# Patient Record
Sex: MALE | Race: WHITE | NOT HISPANIC OR LATINO | Employment: UNEMPLOYED | URBAN - NONMETROPOLITAN AREA
[De-identification: names, ages, dates, MRNs, and addresses within clinical notes are randomized per-mention and may not be internally consistent; named-entity substitution may affect disease eponyms.]

---

## 2021-05-15 ENCOUNTER — HOSPITAL ENCOUNTER (EMERGENCY)
Facility: HOSPITAL | Age: 26
Discharge: HOME | End: 2021-05-15
Attending: EMERGENCY MEDICINE | Admitting: EMERGENCY MEDICINE

## 2021-05-15 VITALS
BODY MASS INDEX: 29.62 KG/M2 | HEIGHT: 69 IN | RESPIRATION RATE: 22 BRPM | TEMPERATURE: 97.6 F | SYSTOLIC BLOOD PRESSURE: 113 MMHG | WEIGHT: 200 LBS | DIASTOLIC BLOOD PRESSURE: 55 MMHG | OXYGEN SATURATION: 88 % | HEART RATE: 76 BPM

## 2021-05-15 DIAGNOSIS — S05.02XA ABRASION OF LEFT CORNEA, INITIAL ENCOUNTER: Primary | ICD-10-CM

## 2021-05-15 PROCEDURE — 99282 EMERGENCY DEPT VISIT SF MDM: CPT

## 2021-05-15 PROCEDURE — 99284 EMERGENCY DEPT VISIT MOD MDM: CPT | Performed by: EMERGENCY MEDICINE

## 2021-05-15 RX ORDER — SPIRONOLACTONE 25 MG/1
25 TABLET ORAL DAILY
COMMUNITY

## 2021-05-15 RX ORDER — FUROSEMIDE 20 MG/1
20 TABLET ORAL
COMMUNITY

## 2021-05-15 RX ORDER — DIGOXIN 125 MCG
125 TABLET ORAL DAILY
COMMUNITY

## 2021-05-15 RX ORDER — LISINOPRIL 40 MG/1
40 TABLET ORAL DAILY
COMMUNITY

## 2021-05-15 RX ORDER — OFLOXACIN 3 MG/ML
1 SOLUTION/ DROPS OPHTHALMIC
Status: DISCONTINUED | OUTPATIENT
Start: 2021-05-15 | End: 2021-05-15 | Stop reason: HOSPADM

## 2021-05-15 RX ADMIN — FLUORESCEIN SODIUM 1 STRIP: 1 STRIP OPHTHALMIC at 20:41

## 2021-05-15 RX ADMIN — OFLOXACIN 1 DROP: 3 SOLUTION/ DROPS OPHTHALMIC at 21:02

## 2021-05-16 NOTE — ED PROVIDER NOTES
History  Chief Complaint   Patient presents with    Foreign Body in Eye     pt chopping wood, states a piece of bark flew into left eye  eye is red and irritated, unsure if anything is still in it     Patient is a 78-year-old male presenting to the emergency department complaining of foreign body sensation to the left eye, states he was chopping wood earlier and felt a small piece of wood fly into his left eye, he is unsure whether foreign body is still in there or if he has possible corneal abrasion, denies any pain or injury elsewhere, no vision changes, no drainage from the eye          Prior to Admission Medications   Prescriptions Last Dose Informant Patient Reported? Taking? ASPIRIN 81 PO   Yes No   Sig: Take 81 mg by mouth daily   digoxin (LANOXIN) 0 125 mg tablet   Yes No   Sig: Take 125 mcg by mouth daily   furosemide (LASIX) 20 mg tablet   Yes No   Sig: Take 20 mg by mouth   lisinopril (ZESTRIL) 40 mg tablet   Yes No   Sig: Take 40 mg by mouth daily   spironolactone (ALDACTONE) 25 mg tablet   Yes No   Sig: Take 25 mg by mouth daily      Facility-Administered Medications: None       History reviewed  No pertinent past medical history  Past Surgical History:   Procedure Laterality Date    CARDIAC SURGERY      HERNIA REPAIR         History reviewed  No pertinent family history  I have reviewed and agree with the history as documented  E-Cigarette/Vaping    E-Cigarette Use Never User      E-Cigarette/Vaping Substances     Social History     Tobacco Use    Smoking status: Never Smoker    Smokeless tobacco: Never Used   Substance Use Topics    Alcohol use: Not Currently    Drug use: Never       Review of Systems   Constitutional: Negative  HENT: Negative  Eyes: Positive for pain and redness  Respiratory: Negative  Cardiovascular: Negative  Gastrointestinal: Negative  Endocrine: Negative  Genitourinary: Negative  Musculoskeletal: Negative  Skin: Negative  Allergic/Immunologic: Negative  Neurological: Negative  Hematological: Negative  Psychiatric/Behavioral: Negative  Physical Exam  Physical Exam  Constitutional:       Appearance: He is well-developed  HENT:      Head: Normocephalic and atraumatic  Eyes:      General: Lids are normal  Lids are everted, no foreign bodies appreciated  Vision grossly intact  Gaze aligned appropriately  Left eye: No foreign body, discharge or hordeolum  Extraocular Movements: Extraocular movements intact  Conjunctiva/sclera: Conjunctivae normal       Pupils: Pupils are equal, round, and reactive to light  Comments: Small corneal abrasion noted to the left eye at the 11 o'clock position on the iris, no foreign body noted   Neck:      Musculoskeletal: Normal range of motion and neck supple  Cardiovascular:      Rate and Rhythm: Normal rate  Pulmonary:      Effort: Pulmonary effort is normal    Abdominal:      Palpations: Abdomen is soft  Musculoskeletal: Normal range of motion  Skin:     General: Skin is warm and dry  Neurological:      Mental Status: He is alert and oriented to person, place, and time           Vital Signs  ED Triage Vitals [05/15/21 2035]   Temperature Pulse Respirations Blood Pressure SpO2   97 6 °F (36 4 °C) 76 22 113/55 (!) 88 %      Temp Source Heart Rate Source Patient Position - Orthostatic VS BP Location FiO2 (%)   Temporal Monitor Sitting Right arm --      Pain Score       --           Vitals:    05/15/21 2035   BP: 113/55   Pulse: 76   Patient Position - Orthostatic VS: Sitting       ED Medications  Medications   fluorescein sodium sterile ophthalmic strip 1 strip (1 strip Left Eye Given 5/15/21 2041)       Diagnostic Studies  Results Reviewed     None                 No orders to display                   ED Course  ED Course as of May 16 0351   Sun May 16, 2021   0351 Patient was small corneal abrasion to the left eye, no foreign body appreciated, provided with antibiotic drops and instructions for use, advised to return if symptoms worsen or any additional concerns, patient acknowledges understanding and agreement with this plan                                SBIRT 20yo+      Most Recent Value   SBIRT (22 yo +)   In order to provide better care to our patients, we are screening all of our patients for alcohol and drug use  Would it be okay to ask you these screening questions? Yes Filed at: 05/15/2021 2042   Initial Alcohol Screen: US AUDIT-C    1  How often do you have a drink containing alcohol?  0 Filed at: 05/15/2021 2042   2  How many drinks containing alcohol do you have on a typical day you are drinking? 0 Filed at: 05/15/2021 2042   3a  Male UNDER 65: How often do you have five or more drinks on one occasion? 0 Filed at: 05/15/2021 2042   3b  FEMALE Any Age, or MALE 65+: How often do you have 4 or more drinks on one occassion? 0 Filed at: 05/15/2021 2042   Audit-C Score  0 Filed at: 05/15/2021 2042   ABBI: How many times in the past year have you    Used an illegal drug or used a prescription medication for non-medical reasons? Never Filed at: 05/15/2021 2042                      Disposition  Final diagnoses:   Abrasion of left cornea, initial encounter     Time reflects when diagnosis was documented in both MDM as applicable and the Disposition within this note     Time User Action Codes Description Comment    5/15/2021  8:55 PM Orvis Lass Add [S05  02XA] Abrasion of left cornea, initial encounter       ED Disposition     ED Disposition Condition Date/Time Comment    Discharge Stable Sat May 15, 2021  8:55 PM Issa Marino discharge to home/self care              Follow-up Information     Follow up With Specialties Details Why Contact Info    [facility]  As needed [address] [facility]  [phone]            Discharge Medication List as of 5/15/2021  8:55 PM      CONTINUE these medications which have NOT CHANGED    Details   ASPIRIN 81 PO Take 81 mg by mouth daily, Historical Med      digoxin (LANOXIN) 0 125 mg tablet Take 125 mcg by mouth daily, Historical Med      furosemide (LASIX) 20 mg tablet Take 20 mg by mouth, Historical Med      lisinopril (ZESTRIL) 40 mg tablet Take 40 mg by mouth daily, Historical Med      spironolactone (ALDACTONE) 25 mg tablet Take 25 mg by mouth daily, Historical Med           No discharge procedures on file      PDMP Review     None          ED Provider  Electronically Signed by           William Sheth DO  05/16/21 9784